# Patient Record
Sex: MALE | Race: WHITE | NOT HISPANIC OR LATINO | Employment: UNEMPLOYED | URBAN - NONMETROPOLITAN AREA
[De-identification: names, ages, dates, MRNs, and addresses within clinical notes are randomized per-mention and may not be internally consistent; named-entity substitution may affect disease eponyms.]

---

## 2019-05-09 ENCOUNTER — OFFICE VISIT (OUTPATIENT)
Dept: CARDIOLOGY | Facility: CLINIC | Age: 44
End: 2019-05-09

## 2019-05-09 VITALS
HEART RATE: 65 BPM | OXYGEN SATURATION: 97 % | DIASTOLIC BLOOD PRESSURE: 78 MMHG | HEIGHT: 65 IN | BODY MASS INDEX: 39.29 KG/M2 | SYSTOLIC BLOOD PRESSURE: 128 MMHG | WEIGHT: 235.8 LBS

## 2019-05-09 DIAGNOSIS — B18.2 CHRONIC HEPATITIS C WITHOUT HEPATIC COMA (HCC): ICD-10-CM

## 2019-05-09 DIAGNOSIS — R09.89 DECREASED PEDAL PULSES: ICD-10-CM

## 2019-05-09 DIAGNOSIS — R60.9 PERIPHERAL EDEMA: ICD-10-CM

## 2019-05-09 DIAGNOSIS — I73.9 CLAUDICATION (HCC): ICD-10-CM

## 2019-05-09 DIAGNOSIS — R07.89 OTHER CHEST PAIN: Primary | ICD-10-CM

## 2019-05-09 DIAGNOSIS — F17.200 SMOKING: ICD-10-CM

## 2019-05-09 DIAGNOSIS — R06.02 SHORTNESS OF BREATH: ICD-10-CM

## 2019-05-09 DIAGNOSIS — K74.60 HEPATIC CIRRHOSIS, UNSPECIFIED HEPATIC CIRRHOSIS TYPE, UNSPECIFIED WHETHER ASCITES PRESENT (HCC): ICD-10-CM

## 2019-05-09 DIAGNOSIS — R00.2 PALPITATIONS: ICD-10-CM

## 2019-05-09 PROBLEM — B19.20 HEPATITIS C: Status: ACTIVE | Noted: 2019-05-09

## 2019-05-09 PROBLEM — G47.33 OSA (OBSTRUCTIVE SLEEP APNEA): Status: ACTIVE | Noted: 2019-05-09

## 2019-05-09 PROBLEM — J30.2 SEASONAL ALLERGIES: Status: ACTIVE | Noted: 2019-05-09

## 2019-05-09 PROBLEM — J44.9 COPD (CHRONIC OBSTRUCTIVE PULMONARY DISEASE) (HCC): Status: ACTIVE | Noted: 2019-05-09

## 2019-05-09 PROBLEM — E11.9 CONTROLLED TYPE 2 DIABETES MELLITUS WITHOUT COMPLICATION, WITHOUT LONG-TERM CURRENT USE OF INSULIN (HCC): Status: ACTIVE | Noted: 2019-05-09

## 2019-05-09 PROCEDURE — 99204 OFFICE O/P NEW MOD 45 MIN: CPT | Performed by: NURSE PRACTITIONER

## 2019-05-09 PROCEDURE — 93000 ELECTROCARDIOGRAM COMPLETE: CPT | Performed by: NURSE PRACTITIONER

## 2019-05-09 RX ORDER — TOBRAMYCIN 3 MG/ML
2 SOLUTION/ DROPS OPHTHALMIC
Refills: 0 | COMMUNITY
Start: 2019-04-25

## 2019-05-09 RX ORDER — OFLOXACIN 3 MG/ML
2 SOLUTION/ DROPS OPHTHALMIC EVERY 4 HOURS
Refills: 0 | COMMUNITY
Start: 2019-04-18

## 2019-05-09 RX ORDER — CETIRIZINE HYDROCHLORIDE 10 MG/1
10 TABLET ORAL DAILY
Refills: 2 | COMMUNITY
Start: 2019-04-18

## 2019-05-09 RX ORDER — NITROGLYCERIN 0.4 MG/1
TABLET SUBLINGUAL
Qty: 30 TABLET | Refills: 3 | Status: SHIPPED | OUTPATIENT
Start: 2019-05-09

## 2019-05-09 RX ORDER — ALBUTEROL SULFATE 90 UG/1
AEROSOL, METERED RESPIRATORY (INHALATION)
Refills: 2 | COMMUNITY
Start: 2019-04-25

## 2019-05-09 NOTE — PATIENT INSTRUCTIONS
Steps to Quit Smoking  Smoking tobacco can be bad for your health. It can also affect almost every organ in your body. Smoking puts you and people around you at risk for many serious long-lasting (chronic) diseases. Quitting smoking is hard, but it is one of the best things that you can do for your health. It is never too late to quit.  What are the benefits of quitting smoking?  When you quit smoking, you lower your risk for getting serious diseases and conditions. They can include:  · Lung cancer or lung disease.  · Heart disease.  · Stroke.  · Heart attack.  · Not being able to have children (infertility).  · Weak bones (osteoporosis) and broken bones (fractures).    If you have coughing, wheezing, and shortness of breath, those symptoms may get better when you quit. You may also get sick less often. If you are pregnant, quitting smoking can help to lower your chances of having a baby of low birth weight.  What can I do to help me quit smoking?  Talk with your doctor about what can help you quit smoking. Some things you can do (strategies) include:  · Quitting smoking totally, instead of slowly cutting back how much you smoke over a period of time.  · Going to in-person counseling. You are more likely to quit if you go to many counseling sessions.  · Using resources and support systems, such as:  ? Online chats with a counselor.  ? Phone quitlines.  ? Printed self-help materials.  ? Support groups or group counseling.  ? Text messaging programs.  ? Mobile phone apps or applications.  · Taking medicines. Some of these medicines may have nicotine in them. If you are pregnant or breastfeeding, do not take any medicines to quit smoking unless your doctor says it is okay. Talk with your doctor about counseling or other things that can help you.    Talk with your doctor about using more than one strategy at the same time, such as taking medicines while you are also going to in-person counseling. This can help make  quitting easier.  What things can I do to make it easier to quit?  Quitting smoking might feel very hard at first, but there is a lot that you can do to make it easier. Take these steps:  · Talk to your family and friends. Ask them to support and encourage you.  · Call phone quitlines, reach out to support groups, or work with a counselor.  · Ask people who smoke to not smoke around you.  · Avoid places that make you want (trigger) to smoke, such as:  ? Bars.  ? Parties.  ? Smoke-break areas at work.  · Spend time with people who do not smoke.  · Lower the stress in your life. Stress can make you want to smoke. Try these things to help your stress:  ? Getting regular exercise.  ? Deep-breathing exercises.  ? Yoga.  ? Meditating.  ? Doing a body scan. To do this, close your eyes, focus on one area of your body at a time from head to toe, and notice which parts of your body are tense. Try to relax the muscles in those areas.  · Download or buy apps on your mobile phone or tablet that can help you stick to your quit plan. There are many free apps, such as QuitGuide from the CDC (Centers for Disease Control and Prevention). You can find more support from smokefree.gov and other websites.    This information is not intended to replace advice given to you by your health care provider. Make sure you discuss any questions you have with your health care provider.  Document Released: 10/14/2010 Document Revised: 08/15/2017 Document Reviewed: 05/03/2016  [employer] Interactive Patient Education © 2019 [employer]  Fat and Cholesterol Restricted Eating Plan  Getting too much fat and cholesterol in your diet may cause health problems. Choosing the right foods helps keep your fat and cholesterol at normal levels. This can keep you from getting certain diseases.  Your doctor may recommend an eating plan that includes:  · Total fat: ______% or less of total calories a day.  · Saturated fat: ______% or less of total calories a  "day.  · Cholesterol: less than _________mg a day.  · Fiber: ______g a day.    What are tips for following this plan?  General tips  · Work with your doctor to lose weight if you need to.  · Avoid:  ? Foods with added sugar.  ? Fried foods.  ? Foods with partially hydrogenated oils.  · Limit alcohol intake to no more than 1 drink a day for nonpregnant women and 2 drinks a day for men. One drink equals 12 oz of beer, 5 oz of wine, or 1½ oz of hard liquor.  Reading food labels  · Check food labels for:  ? Trans fats.  ? Partially hydrogenated oils.  ? Saturated fat (g) in each serving.  ? Cholesterol (mg) in each serving.  ? Fiber (g) in each serving.  · Choose foods with healthy fats, such as:  ? Monounsaturated fats.  ? Polyunsaturated fats.  ? Omega-3 fats.  · Choose grain products that have whole grains. Look for the word \"whole\" as the first word in the ingredient list.  Cooking  · Cook foods using low-fat methods. These include baking, boiling, grilling, and broiling.  · Eat more home-cooked foods. Eat at restaurants and buffets less often.  · Avoid cooking using saturated fats, such as butter, cream, palm oil, palm kernel oil, and coconut oil.  Meal planning  · At meals, divide your plate into four equal parts:  ? Fill one-half of your plate with vegetables and green salads.  ? Fill one-fourth of your plate with whole grains.  ? Fill one-fourth of your plate with low-fat (lean) protein foods.  · Eat fish that is high in omega-3 fats at least two times a week. This includes mackerel, tuna, sardines, and salmon.  · Eat foods that are high in fiber, such as whole grains, beans, apples, broccoli, carrots, peas, and barley.  Recommended foods  Grains  · Whole grains, such as whole wheat or whole grain breads, crackers, cereals, and pasta. Unsweetened oatmeal, bulgur, barley, quinoa, or brown rice. Corn or whole wheat flour tortillas.  Vegetables  · Fresh or frozen vegetables (raw, steamed, roasted, or grilled). Green " salads.  Fruits  · All fresh, canned (in natural juice), or frozen fruits.  Meats and other protein foods  · Ground beef (85% or leaner), grass-fed beef, or beef trimmed of fat. Skinless chicken or turkey. Ground chicken or turkey. Pork trimmed of fat. All fish and seafood. Egg whites. Dried beans, peas, or lentils. Unsalted nuts or seeds. Unsalted canned beans. Nut butters without added sugar or oil.  Dairy  · Low-fat or nonfat dairy products, such as skim or 1% milk, 2% or reduced-fat cheeses, low-fat and fat-free ricotta or cottage cheese, or plain low-fat and nonfat yogurt.  Fats and oils  · Tub margarine without trans fats. Light or reduced-fat mayonnaise and salad dressings. Avocado. Olive, canola, sesame, or safflower oils.  The items listed above may not be a complete list of recommended foods or beverages. Contact your dietitian for more options.  Foods to avoid  Grains  · White bread. White pasta. White rice. Cornbread. Bagels, pastries, and croissants. Crackers and snack foods that contain trans fat and hydrogenated oils.  Vegetables  · Vegetables cooked in cheese, cream, or butter sauce. Fried vegetables.  Fruits  · Canned fruit in heavy syrup. Fruit in cream or butter sauce. Fried fruit.  Meats and other protein foods  · Fatty cuts of meat. Ribs, chicken wings, garcia, sausage, bologna, salami, chitterlings, fatback, hot dogs, bratwurst, and packaged lunch meats. Liver and organ meats. Whole eggs and egg yolks. Chicken and turkey with skin. Fried meat.  Dairy  · Whole or 2% milk, cream, half-and-half, and cream cheese. Whole milk cheeses. Whole-fat or sweetened yogurt. Full-fat cheeses. Nondairy creamers and whipped toppings. Processed cheese, cheese spreads, and cheese curds.  Beverages  · Alcohol. Sugar-sweetened drinks such as sodas, lemonade, and fruit drinks.  Fats and oils  · Butter, stick margarine, lard, shortening, ghee, or garcia fat. Coconut, palm kernel, and palm oils.  Sweets and  desserts  · Corn syrup, sugars, honey, and molasses. Candy. Jam and jelly. Syrup. Sweetened cereals. Cookies, pies, cakes, donuts, muffins, and ice cream.  The items listed above may not be a complete list of foods and beverages to avoid. Contact your dietitian for more information.  Summary  · Choosing the right foods helps keep your fat and cholesterol at normal levels. This can keep you from getting certain diseases.  · At meals, fill one-half of your plate with vegetables and green salads.  · Eat high-fiber foods, like whole grains, beans, apples, carrots, peas, and barley.  · Limit added sugar, saturated fats, alcohol, and fried foods.  This information is not intended to replace advice given to you by your health care provider. Make sure you discuss any questions you have with your health care provider.  Document Released: 06/18/2013 Document Revised: 09/04/2018 Document Reviewed: 09/04/2018  [employer] Interactive Patient Education © 2019 [employer]  BMI for Adults  Body mass index (BMI) is a number that is calculated from a person's weight and height. In most adults, the number is used to find how much of an adult's weight is made up of fat. BMI is not as accurate as a direct measure of body fat.  How is BMI calculated?  BMI is calculated by dividing weight in kilograms by height in meters squared. It can also be calculated by dividing weight in pounds by height in inches squared, then multiplying the resulting number by 703. Charts are available to help you find your BMI quickly and easily without doing this calculation.  How is BMI interpreted?  Health care professionals use BMI charts to identify whether an adult is underweight, at a normal weight, or overweight based on the following guidelines:  · Underweight: BMI less than 18.5.  · Normal weight: BMI between 18.5 and 24.9.  · Overweight: BMI between 25 and 29.9.  · Obese: BMI of 30 and above.    BMI is usually interpreted the same for males and  females.  Weight includes both fat and muscle, so someone with a muscular build, such as an athlete, may have a BMI that is higher than 24.9. In cases like these, BMI may not accurately depict body fat. To determine if excess body fat is the cause of a BMI of 25 or higher, further assessments may need to be done by a health care provider.  Why is BMI a useful tool?  BMI is used to identify a possible weight problem that may be related to a medical problem or may increase the risk for medical problems. BMI can also be used to promote changes to reach a healthy weight.  This information is not intended to replace advice given to you by your health care provider. Make sure you discuss any questions you have with your health care provider.  Document Released: 08/29/2005 Document Revised: 04/27/2017 Document Reviewed: 05/15/2015  [employer] Interactive Patient Education © 2018 [employer]    Thrombocytopenia  Thrombocytopenia means that you have a low number of platelets in your blood. Platelets are tiny cells in the blood. When you bleed, they clump together at the cut or injury to stop the bleeding. This is called blood clotting. Not having enough platelets can cause bleeding problems.  Follow these instructions at home:  General instructions  · Check your skin and inside your mouth for bruises or blood as told by your doctor.  · Check to see if there is blood in your spit (sputum), pee (urine), and poop (stool). Do this as told by your doctor.  · Ask your doctor if you can drink alcohol.  · Take over-the-counter and prescription medicines only as told by your doctor.  · Tell all of your doctors that you have this condition. Be sure to tell your dentist and eye doctor too.  Activity  · Do not do activities that can cause bumps or bruises until your doctor says it is okay.  · Be careful not to cut yourself:  ? When you shave.  ? When you use scissors, needles, knives, or other tools.  · Be careful not to burn  yourself:  ? When you use an iron.  ? When you cook.  Contact a doctor if:  · You have bruises and you do not know why.  Get help right away if:  · You are bleeding anywhere on your body.  · You have blood in your spit, pee, or poop.  This information is not intended to replace advice given to you by your health care provider. Make sure you discuss any questions you have with your health care provider.  Document Released: 12/06/2012 Document Revised: 08/20/2017 Document Reviewed: 06/20/2016  [employer] Interactive Patient Education © 2019 [employer]    Edema  Edema is an abnormal buildup of fluids in the body tissues and under the skin. Swelling of the legs, feet, and ankles is a common symptom that becomes more likely as you get older. Swelling is also common in looser tissues, like around the eyes. When the affected area is squeezed, the fluid may move out of that spot and leave a dent for a few moments. This dent is called pitting edema.  There are many possible causes of edema. Eating too much salt (sodium) and being on your feet or sitting for a long time can cause edema in your legs, feet, and ankles. Hot weather may make edema worse. Common causes of edema include:  · Heart failure.  · Liver or kidney disease.  · Weak leg blood vessels.  · Cancer.  · An injury.  · Pregnancy.  · Medicines.  · Being obese.  · Low protein levels in the blood.    Edema is usually painless. Your skin may look swollen or shiny.  Follow these instructions at home:  · Keep the affected body part raised (elevated) above the level of your heart when you are sitting or lying down.  · Do not sit still or stand for long periods of time.  · Do not wear tight clothing. Do not wear garters on your upper legs.  · Exercise your legs to get your circulation going. This helps to move the fluid back into your blood vessels, and it may help the swelling go down.  · Wear elastic bandages or support stockings to reduce swelling as told by your  health care provider.  · Eat a low-salt (low-sodium) diet to reduce fluid as told by your health care provider.  · Depending on the cause of your swelling, you may need to limit how much fluid you drink (fluid restriction).  · Take over-the-counter and prescription medicines only as told by your health care provider.  Contact a health care provider if:  · Your edema does not get better with treatment.  · You have heart, liver, or kidney disease and have symptoms of edema.  · You have sudden and unexplained weight gain.  Get help right away if:  · You develop shortness of breath or chest pain.  · You cannot breathe when you lie down.  · You develop pain, redness, or warmth in the swollen areas.  · You have heart, liver, or kidney disease and suddenly get edema.  · You have a fever and your symptoms suddenly get worse.  Summary  · Edema is an abnormal buildup of fluids in the body tissues and under the skin.  · Eating too much salt (sodium) and being on your feet or sitting for a long time can cause edema in your legs, feet, and ankles.  · Keep the affected body part raised (elevated) above the level of your heart when you are sitting or lying down.  This information is not intended to replace advice given to you by your health care provider. Make sure you discuss any questions you have with your health care provider.  Document Released: 12/18/2006 Document Revised: 01/20/2018 Document Reviewed: 01/20/2018  [employer] Interactive Patient Education © 2019 [employer]    Palpitations  A palpitation is the feeling that your heartbeat is irregular or is faster than normal. It may feel like your heart is fluttering or skipping a beat. Palpitations are usually not a serious problem. They may be caused by many things, including smoking, caffeine, alcohol, stress, and certain medicines. Although most causes of palpitations are not serious, palpitations can be a sign of a serious medical problem. In some cases, you may need  further medical evaluation.  Follow these instructions at home:  Pay attention to any changes in your symptoms. Take these actions to help with your condition:  · Avoid the following:  ? Caffeinated coffee, tea, soft drinks, diet pills, and energy drinks.  ? Chocolate.  ? Alcohol.  · Do not use any tobacco products, such as cigarettes, chewing tobacco, and e-cigarettes. If you need help quitting, ask your health care provider.  · Try to reduce your stress and anxiety. Things that can help you relax include:  ? Yoga.  ? Meditation.  ? Physical activity, such as swimming, jogging, or walking.  ? Biofeedback. This is a method that helps you learn to use your mind to control things in your body, such as your heartbeats.  · Get plenty of rest and sleep.  · Take over-the-counter and prescription medicines only as told by your health care provider.  · Keep all follow-up visits as told by your health care provider. This is important.    Contact a health care provider if:  · You continue to have a fast or irregular heartbeat after 24 hours.  · Your palpitations occur more often.  Get help right away if:  · You have chest pain or shortness of breath.  · You have a severe headache.  · You feel dizzy or you faint.  This information is not intended to replace advice given to you by your health care provider. Make sure you discuss any questions you have with your health care provider.  Document Released: 12/15/2001 Document Revised: 05/22/2017 Document Reviewed: 09/01/2016  [employer] Interactive Patient Education © 2019 [employer]    Nonspecific Chest Pain  Chest pain can be caused by many different conditions. There is a chance that your pain could be related to something serious, such as a heart attack or a blood clot in your lungs. Chest pain can also be caused by conditions that are not life-threatening. If you have chest pain, it is very important to follow up with your doctor.  Follow these instructions at  home:  Medicines  · If you were prescribed an antibiotic medicine, take it as told by your doctor. Do not stop taking the antibiotic even if you start to feel better.  · Take over-the-counter and prescription medicines only as told by your doctor.  Lifestyle  · Do not use any products that contain nicotine or tobacco, such as cigarettes and e-cigarettes. If you need help quitting, ask your doctor.  · Do not drink alcohol.  · Make lifestyle changes as told by your doctor. These may include:  ? Getting regular exercise. Ask your doctor for some activities that are safe for you.  ? Eating a heart-healthy diet. A diet specialist (dietitian) can help you to learn healthy eating options.  ? Staying at a healthy weight.  ? Managing diabetes, if needed.  ? Lowering your stress, as with deep breathing or spending time in nature.  General instructions  · Avoid any activities that make you feel chest pain.  · If your chest pain is because of heartburn:  ? Raise (elevate) the head of your bed about 6 inches (15 cm). You can do this by putting blocks under the bed legs at the head of the bed.  ? Do not sleep with extra pillows under your head. That does not help heartburn.  · Keep all follow-up visits as told by your doctor. This is important. This includes any further testing if your chest pain does not go away.  Contact a doctor if:  · Your chest pain does not go away.  · You have a rash with blisters on your chest.  · You have a fever.  · You have chills.  Get help right away if:  · Your chest pain is worse.  · You have a cough that gets worse, or you cough up blood.  · You have very bad (severe) pain in your belly (abdomen).  · You are very weak.  · You pass out (faint).  · You have either of these for no clear reason:  ? Sudden chest discomfort.  ? Sudden discomfort in your arms, back, neck, or jaw.  · You have shortness of breath at any time.  · You suddenly start to sweat, or your skin gets clammy.  · You feel sick to  your stomach (nauseous).  · You throw up (vomit).  · You suddenly feel light-headed or dizzy.  · Your heart starts to beat fast, or it feels like it is skipping beats.  These symptoms may be an emergency. Do not wait to see if the symptoms will go away. Get medical help right away. Call your local emergency services (911 in the [place] Do not drive yourself to the hospital.  This information is not intended to replace advice given to you by your health care provider. Make sure you discuss any questions you have with your health care provider.  Document Released: 06/05/2009 Document Revised: 09/11/2017 Document Reviewed: 09/11/2017  [employer] Interactive Patient Education © 2019 [employer]

## 2019-05-09 NOTE — PROGRESS NOTES
Subjective   Ricardo Quiroz is a 43 y.o. male     Chief Complaint   Patient presents with   • Establish Care     Pt referred for CP and pressure    • Chest Pain   • Edema       HPI    Problem list:    1.  Chest pain  2.  Palpitations  3.  Shortness of breath  4.  PVD, per patient report  5.  COPD  6.  AQUILES, intolerant to CPAP, per patient report  7.  Cirrhosis of the liver  8.  Hepatitis C  9.  Type 2 diabetes mellitus  10.  Peripheral edema  11.  Smoking habituation    Patient is a 43-year-old male who presents today to establish care.  Patient says for approximately 1 year he has had these pinching episodes that then turned into a pressure in the right side of his chest.  He says it can occur at any time.  He says he has it at least a couple times a week.  He says he does get nauseated, lightheaded, diaphoretic and short of breath when it occurs.  He says he has palpitations that will take his breath away.  Sometimes it skips but he says he only has it a couple times a month.  Patient says he does get dizzy mostly it occurs when he stands.  He denies any presyncope or syncope.  He denies any orthopnea PND.  He says he does have edema he says which he has had in the past but is always gone away.  He says this time it is not gone away.  He says is been about a month and a half.  He says he does get short of breath with any activity and again this is been about a year and it seems like it changes.  Patient says he is been fatigued for a few months now.  Patient says he stopped taking all his medications for some time and just got reestablished with a new PCP.  His hemoglobin A1c was actually very good considering he is not been on medications.  It was 5.3 which is considered normal.  His cholesterol was very good.  His liver enzymes were elevated and his platelets were less than 100.  Is not been following with a gastroenterologist.  Patient says that he had work-up at  for PVD and had significant blockage in his  legs but did not have any stents placed.  He says that was a few years ago.  Patient says that he was diagnosed with hepatitis about 8 years ago.    Occupation: Disabled due to back and legs used to be a   Smokes a pack a day for 35 years; no alcohol; smokes marijuana daily; he quit methamphetamines and cocaine about 8 years ago however he used methamphetamines every day for 2 years and he used cocaine here and there for roughly 2 years as well.  He says he also used narcotic medications for roughly 3 to 4 years on a daily basis.  Patient drinks 8-12 Pepsi's per day; no coffee; rare glass of iced tea    Mom 61 -lung disease, CHF  Dad 70 -car accident, no heart problems  Maternal grandmother -stroke, MI, type 1 diabetes  Maternal uncle 62 alive-MI    Current Outpatient Medications   Medication Sig Dispense Refill   • cetirizine (zyrTEC) 10 MG tablet Take 10 mg by mouth Daily.  2   • ofloxacin (OCUFLOX) 0.3 % ophthalmic solution Administer 2 drops into the left eye Every 4 (Four) Hours.  0   • tobramycin 0.3 % solution ophthalmic solution 2 drops Every 4 (Four) Hours.  0   • VENTOLIN  (90 Base) MCG/ACT inhaler   2   • nitroglycerin (NITROSTAT) 0.4 MG SL tablet 1 under the tongue as needed for angina, may repeat q5mins for up three doses 30 tablet 3     No current facility-administered medications for this visit.        ALLERGIES    Lisinopril-hydrochlorothiazide    Past Medical History:   Diagnosis Date   • Anxiety    • Arthritis    • Asthma    • COPD (chronic obstructive pulmonary disease) (CMS/[facility])    • Depression    • Diabetes mellitus, type 2 (CMS/HCC)    • Emphysema lung (CMS/[facility])    • Hepatitis C    • Hyperlipidemia    • Hypertension    • AQUILES (obstructive sleep apnea)    • Psoriasis        Social History     Socioeconomic History   • Marital status:      Spouse name: Not on file   • Number of children: Not on file   • Years of education: Not on file  "  • Highest education level: Not on file   Tobacco Use   • Smoking status: Current Every Day Smoker     Packs/day: 1.00     Years: 35.00     Pack years: 35.00   • Smokeless tobacco: Never Used   Substance and Sexual Activity   • Alcohol use: No     Frequency: Never   • Drug use: Yes     Types: Marijuana   • Sexual activity: Defer       Family History   Problem Relation Age of Onset   • Emphysema Mother    • COPD Mother    • Heart failure Mother    • No Known Problems Father        Review of Systems   Constitutional: Positive for diaphoresis (with CP ) and fatigue (couple mos now).   HENT: Positive for congestion and rhinorrhea (allergies ). Negative for sore throat.    Eyes: Positive for visual disturbance (glasses daily ).   Respiratory: Positive for apnea (untreated AQUILES), chest tightness and shortness of breath (mainly w/any activity for about 1 yr, can happen at rest; with CP  ).    Cardiovascular: Positive for chest pain (pinching pain on right side of chest, then becomes pressure-like pain; occurs at anytime; for about 1 yr; occurs about a few times a week ), palpitations (States that it takes his breath away; skipping; occurs couple times a month; for about a few yrs ) and leg swelling (BLE edema; started 1 - 1/2 mos ago; happened before, but never stayed like this ).   Gastrointestinal: Positive for abdominal pain (States it's from Hep C ) and nausea (with CP ). Negative for blood in stool, constipation, diarrhea and vomiting.   Endocrine: Negative for cold intolerance and heat intolerance.   Genitourinary: Negative for difficulty urinating, dysuria, frequency, hematuria and urgency.   Musculoskeletal: Positive for arthralgias and back pain (lower ). Negative for neck pain.        Pain with walking that goes away with rest in his legs.  The pain is severe.    Skin: Positive for rash (Rash on chest x 1 week. Pt denies burning or itching, states \"just a lot of bumps.\" ). Negative for wound. " "  Allergic/Immunologic: Positive for environmental allergies. Negative for food allergies.   Neurological: Positive for dizziness (random; mostly when stand ), weakness (states his body feels \"heavy\" ), light-headedness (with CP ) and numbness (hands and legs ). Negative for syncope and headaches.   Hematological: Does not bruise/bleed easily.   Psychiatric/Behavioral: Positive for sleep disturbance (Struggles to fall asleep ). Negative for agitation. The patient is nervous/anxious (\"sometimes\" ).        Objective   /78   Pulse 65   Ht 165.1 cm (65\")   Wt 107 kg (235 lb 12.8 oz)   SpO2 97%   BMI 39.24 kg/m²   Vitals:    05/09/19 1518   BP: 128/78   Pulse: 65   SpO2: 97%   Weight: 107 kg (235 lb 12.8 oz)   Height: 165.1 cm (65\")      Lab Results (most recent)     None        Physical Exam   Constitutional: He is oriented to person, place, and time. Vital signs are normal. He appears well-developed and well-nourished. He is active and cooperative.   HENT:   Head: Normocephalic.   Mouth/Throat: Abnormal dentition (poor ).   Eyes: Lids are normal.   Wears glasses    Neck: Normal carotid pulses, no hepatojugular reflux and no JVD present. Carotid bruit is not present.   Cardiovascular: Normal rate, regular rhythm and normal heart sounds.   Pulses:       Radial pulses are 2+ on the right side, and 2+ on the left side.   Decreased pedal pulses BLE; trace - 1+ edema BLE.   Pulmonary/Chest: Effort normal and breath sounds normal.   Abdominal: Normal appearance and bowel sounds are normal.   Neurological: He is alert and oriented to person, place, and time.   Skin: Skin is warm, dry and intact.   Psychiatric: He has a normal mood and affect. His speech is normal and behavior is normal. Judgment and thought content normal. Cognition and memory are normal.       Procedure     ECG 12 Lead  Date/Time: 5/9/2019 3:39 PM  Performed by: Sanjuana Schneider APRN  Authorized by: Sanjuana Schneider APRN   Comparison: not compared " with previous ECG   Rhythm: sinus bradycardia  Rate: bradycardic  BPM: 59  QRS axis: normal    Clinical impression: normal ECG                 Assessment/Plan      Diagnosis Plan   1. Other chest pain  Stress Test With Myocardial Perfusion One Day    Adult Transthoracic Echo Complete W/ Cont if Necessary Per Protocol   2. Peripheral edema  Stress Test With Myocardial Perfusion One Day    Adult Transthoracic Echo Complete W/ Cont if Necessary Per Protocol    Duplex Lower Extremity Art / Grafts - Bilateral CAR    nitroglycerin (NITROSTAT) 0.4 MG SL tablet   3. Smoking  Stress Test With Myocardial Perfusion One Day    Adult Transthoracic Echo Complete W/ Cont if Necessary Per Protocol   4. Shortness of breath  Stress Test With Myocardial Perfusion One Day    Adult Transthoracic Echo Complete W/ Cont if Necessary Per Protocol   5. Palpitations  Stress Test With Myocardial Perfusion One Day    Adult Transthoracic Echo Complete W/ Cont if Necessary Per Protocol   6. Claudication (CMS/HCC)  Duplex Lower Extremity Art / Grafts - Bilateral CAR   7. Decreased pedal pulses  Duplex Lower Extremity Art / Grafts - Bilateral CAR   8. Hepatic cirrhosis, unspecified hepatic cirrhosis type, unspecified whether ascites present (CMS/HCC)  Ambulatory Referral to Gastroenterology   9. Chronic hepatitis C without hepatic coma (CMS/HCC)  Ambulatory Referral to Gastroenterology       Return in about 10 weeks (around 7/18/2019).  Chest pain/peripheral edema/smoking/shortness of breath/palpitations-patient will have ischemia work-up, stress and echo.  He will use nitro as needed for chest pain no resolution he will go to the ER.  I am not going to start aspirin at this time due to the fact that his platelets are less than 100.  Decreased pedal pulses/claudication/peripheral edema/smoking-patient had arterial ultrasound of bilateral lower extremities.  Hepatitis C and cirrhosis-patient will be referred to gastroenterology.  He would prefer to  go to someone here in [place] so we will refer him to Dr. Valdivia's office.  He will continue his medication regimen for now.  He will follow-up in 10 weeks or sooner if any changes.     Patient will wait on event monitor as he needs to find an address for us to be able to mail it to.  When he comes back he will have an address at which we can order monitor at that time.    I advised Ricardo of the risks of continuing to use tobacco, and I provided him with tobacco cessation educational materials in the After Visit Summary.     During this visit, I spent >3 minutes counseling the patient regarding tobacco cessation.    Patient's Body mass index is 39.24 kg/m². BMI is above normal parameters. Recommendations include: educational material.      Electronically signed by:

## 2019-11-19 ENCOUNTER — TELEPHONE (OUTPATIENT)
Dept: CARDIOLOGY | Facility: CLINIC | Age: 44
End: 2019-11-19